# Patient Record
Sex: MALE | Race: WHITE
[De-identification: names, ages, dates, MRNs, and addresses within clinical notes are randomized per-mention and may not be internally consistent; named-entity substitution may affect disease eponyms.]

---

## 2017-06-14 ENCOUNTER — HOSPITAL ENCOUNTER (OUTPATIENT)
Dept: HOSPITAL 8 - RAD | Age: 57
Discharge: HOME | End: 2017-06-14
Attending: NEUROLOGICAL SURGERY
Payer: COMMERCIAL

## 2017-06-14 DIAGNOSIS — M47.896: ICD-10-CM

## 2017-06-14 DIAGNOSIS — G89.29: ICD-10-CM

## 2017-06-14 DIAGNOSIS — M51.26: Primary | ICD-10-CM

## 2017-06-14 DIAGNOSIS — M51.24: ICD-10-CM

## 2017-06-14 DIAGNOSIS — Z98.1: ICD-10-CM

## 2017-06-14 DIAGNOSIS — M48.06: ICD-10-CM

## 2017-06-14 DIAGNOSIS — N28.1: ICD-10-CM

## 2017-06-14 PROCEDURE — 72131 CT LUMBAR SPINE W/O DYE: CPT

## 2017-06-14 PROCEDURE — 72146 MRI CHEST SPINE W/O DYE: CPT

## 2017-06-26 ENCOUNTER — HOSPITAL ENCOUNTER (OUTPATIENT)
Dept: HOSPITAL 8 - RAD | Age: 57
Discharge: HOME | End: 2017-06-26
Attending: FAMILY MEDICINE
Payer: COMMERCIAL

## 2017-06-26 DIAGNOSIS — H53.8: ICD-10-CM

## 2017-06-26 DIAGNOSIS — G31.84: Primary | ICD-10-CM

## 2017-06-26 DIAGNOSIS — G44.52: ICD-10-CM

## 2017-06-26 PROCEDURE — A9585 GADOBUTROL INJECTION: HCPCS

## 2017-06-26 PROCEDURE — 70553 MRI BRAIN STEM W/O & W/DYE: CPT

## 2017-12-19 ENCOUNTER — HOSPITAL ENCOUNTER (OUTPATIENT)
Dept: HOSPITAL 8 - CARD | Age: 57
Discharge: HOME | End: 2017-12-19
Attending: PSYCHIATRY & NEUROLOGY
Payer: COMMERCIAL

## 2017-12-19 DIAGNOSIS — G44.021: ICD-10-CM

## 2017-12-19 DIAGNOSIS — R41.89: ICD-10-CM

## 2017-12-19 DIAGNOSIS — G43.111: Primary | ICD-10-CM

## 2017-12-19 PROCEDURE — 95819 EEG AWAKE AND ASLEEP: CPT

## 2017-12-27 ENCOUNTER — HOSPITAL ENCOUNTER (OUTPATIENT)
Dept: HOSPITAL 8 - RAD | Age: 57
Discharge: HOME | End: 2017-12-27
Attending: OTOLARYNGOLOGY
Payer: COMMERCIAL

## 2017-12-27 DIAGNOSIS — J38.00: ICD-10-CM

## 2017-12-27 DIAGNOSIS — R13.10: Primary | ICD-10-CM

## 2017-12-27 PROCEDURE — A9585 GADOBUTROL INJECTION: HCPCS

## 2017-12-27 PROCEDURE — 70543 MRI ORBT/FAC/NCK W/O &W/DYE: CPT

## 2017-12-27 PROCEDURE — 74220 X-RAY XM ESOPHAGUS 1CNTRST: CPT

## 2018-03-08 ENCOUNTER — HOSPITAL ENCOUNTER (OUTPATIENT)
Dept: HOSPITAL 8 - RAD | Age: 58
Discharge: HOME | End: 2018-03-08
Attending: INTERNAL MEDICINE
Payer: COMMERCIAL

## 2018-03-08 DIAGNOSIS — T81.4XXS: ICD-10-CM

## 2018-03-08 DIAGNOSIS — Z79.2: Primary | ICD-10-CM

## 2018-03-08 DIAGNOSIS — B95.61: ICD-10-CM

## 2018-03-08 PROCEDURE — C1751 CATH, INF, PER/CENT/MIDLINE: HCPCS

## 2018-03-08 PROCEDURE — 36569 INSJ PICC 5 YR+ W/O IMAGING: CPT

## 2018-03-08 PROCEDURE — 76937 US GUIDE VASCULAR ACCESS: CPT

## 2018-03-08 PROCEDURE — 77001 FLUOROGUIDE FOR VEIN DEVICE: CPT

## 2018-03-14 ENCOUNTER — HOSPITAL ENCOUNTER (OUTPATIENT)
Dept: HOSPITAL 8 - CLISVCS | Age: 58
Discharge: HOME | End: 2018-03-14
Attending: INTERNAL MEDICINE
Payer: COMMERCIAL

## 2018-03-14 DIAGNOSIS — R07.89: Primary | ICD-10-CM

## 2018-03-14 DIAGNOSIS — R94.31: ICD-10-CM

## 2018-03-14 PROCEDURE — 93005 ELECTROCARDIOGRAM TRACING: CPT

## 2018-07-06 ENCOUNTER — HOSPITAL ENCOUNTER (OUTPATIENT)
Dept: HOSPITAL 8 - RAD | Age: 58
Discharge: HOME | End: 2018-07-06
Attending: FAMILY MEDICINE
Payer: COMMERCIAL

## 2018-07-06 DIAGNOSIS — R51: ICD-10-CM

## 2018-07-06 DIAGNOSIS — R41.3: ICD-10-CM

## 2018-07-06 DIAGNOSIS — R42: ICD-10-CM

## 2018-07-06 DIAGNOSIS — R53.1: ICD-10-CM

## 2018-07-06 DIAGNOSIS — R41.840: Primary | ICD-10-CM

## 2018-07-06 PROCEDURE — A9585 GADOBUTROL INJECTION: HCPCS

## 2018-07-06 PROCEDURE — 70553 MRI BRAIN STEM W/O & W/DYE: CPT

## 2018-08-22 ENCOUNTER — HOSPITAL ENCOUNTER (OUTPATIENT)
Dept: HOSPITAL 8 - CFH | Age: 58
Discharge: HOME | End: 2018-08-22
Attending: NURSE PRACTITIONER
Payer: COMMERCIAL

## 2018-08-22 DIAGNOSIS — M47.892: ICD-10-CM

## 2018-08-22 DIAGNOSIS — Z88.5: ICD-10-CM

## 2018-08-22 DIAGNOSIS — M48.02: Primary | ICD-10-CM

## 2018-08-22 DIAGNOSIS — Z87.891: ICD-10-CM

## 2018-08-22 PROCEDURE — 72141 MRI NECK SPINE W/O DYE: CPT

## 2018-08-29 ENCOUNTER — HOSPITAL ENCOUNTER (OUTPATIENT)
Dept: HOSPITAL 8 - OR | Age: 58
Discharge: HOME | End: 2018-08-29
Attending: OTOLARYNGOLOGY
Payer: COMMERCIAL

## 2018-08-29 VITALS — SYSTOLIC BLOOD PRESSURE: 123 MMHG | DIASTOLIC BLOOD PRESSURE: 81 MMHG

## 2018-08-29 VITALS — BODY MASS INDEX: 25.17 KG/M2 | HEIGHT: 72 IN | WEIGHT: 185.85 LBS

## 2018-08-29 DIAGNOSIS — Z88.6: ICD-10-CM

## 2018-08-29 DIAGNOSIS — Z87.891: ICD-10-CM

## 2018-08-29 DIAGNOSIS — J38.01: Primary | ICD-10-CM

## 2018-08-29 DIAGNOSIS — Z98.890: ICD-10-CM

## 2018-08-29 DIAGNOSIS — G43.909: ICD-10-CM

## 2018-08-29 DIAGNOSIS — Z88.8: ICD-10-CM

## 2018-08-29 DIAGNOSIS — I69.354: ICD-10-CM

## 2018-08-29 DIAGNOSIS — Z86.73: ICD-10-CM

## 2018-08-29 DIAGNOSIS — Z88.5: ICD-10-CM

## 2018-08-29 PROCEDURE — C1878 MATRL FOR VOCAL CORD: HCPCS

## 2018-08-29 PROCEDURE — 31571 LARYNGOSCOP W/VC INJ + SCOPE: CPT

## 2018-11-19 ENCOUNTER — HOSPITAL ENCOUNTER (OUTPATIENT)
Dept: HOSPITAL 8 - RAD | Age: 58
Discharge: HOME | End: 2018-11-19
Attending: NURSE PRACTITIONER
Payer: COMMERCIAL

## 2018-11-19 DIAGNOSIS — R20.2: ICD-10-CM

## 2018-11-19 DIAGNOSIS — G43.109: Primary | ICD-10-CM

## 2018-11-19 PROCEDURE — A9585 GADOBUTROL INJECTION: HCPCS

## 2018-11-19 PROCEDURE — 70553 MRI BRAIN STEM W/O & W/DYE: CPT

## 2018-12-26 ENCOUNTER — HOSPITAL ENCOUNTER (OUTPATIENT)
Dept: HOSPITAL 8 - RAD | Age: 58
Discharge: HOME | End: 2018-12-26
Attending: PHYSICAL MEDICINE & REHABILITATION
Payer: COMMERCIAL

## 2018-12-26 DIAGNOSIS — M48.02: Primary | ICD-10-CM

## 2018-12-26 PROCEDURE — 72141 MRI NECK SPINE W/O DYE: CPT

## 2019-02-26 ENCOUNTER — HOSPITAL ENCOUNTER (OUTPATIENT)
Dept: HOSPITAL 8 - CFH | Age: 59
Discharge: HOME | End: 2019-02-26
Attending: NEUROLOGICAL SURGERY
Payer: COMMERCIAL

## 2019-02-26 DIAGNOSIS — M50.322: Primary | ICD-10-CM

## 2019-02-26 PROCEDURE — 72040 X-RAY EXAM NECK SPINE 2-3 VW: CPT

## 2019-06-14 ENCOUNTER — HOSPITAL ENCOUNTER (OUTPATIENT)
Dept: HOSPITAL 8 - CACL | Age: 59
Discharge: HOME | End: 2019-06-14
Attending: INTERNAL MEDICINE
Payer: COMMERCIAL

## 2019-06-14 VITALS — HEIGHT: 72 IN | WEIGHT: 212.44 LBS | BODY MASS INDEX: 28.77 KG/M2

## 2019-06-14 VITALS — SYSTOLIC BLOOD PRESSURE: 139 MMHG | DIASTOLIC BLOOD PRESSURE: 87 MMHG

## 2019-06-14 DIAGNOSIS — Z98.890: ICD-10-CM

## 2019-06-14 DIAGNOSIS — I34.1: ICD-10-CM

## 2019-06-14 DIAGNOSIS — Z79.899: ICD-10-CM

## 2019-06-14 DIAGNOSIS — E78.2: ICD-10-CM

## 2019-06-14 DIAGNOSIS — Z88.8: ICD-10-CM

## 2019-06-14 DIAGNOSIS — I10: ICD-10-CM

## 2019-06-14 DIAGNOSIS — I34.0: Primary | ICD-10-CM

## 2019-06-14 DIAGNOSIS — Z90.49: ICD-10-CM

## 2019-06-14 PROCEDURE — 93312 ECHO TRANSESOPHAGEAL: CPT

## 2019-06-14 PROCEDURE — 93325 DOPPLER ECHO COLOR FLOW MAPG: CPT

## 2019-06-26 ENCOUNTER — HOSPITAL ENCOUNTER (OUTPATIENT)
Dept: HOSPITAL 8 - CACL | Age: 59
Discharge: HOME | End: 2019-06-26
Attending: INTERNAL MEDICINE
Payer: COMMERCIAL

## 2019-06-26 VITALS — BODY MASS INDEX: 29.05 KG/M2 | HEIGHT: 72 IN | WEIGHT: 214.44 LBS

## 2019-06-26 VITALS — SYSTOLIC BLOOD PRESSURE: 136 MMHG | DIASTOLIC BLOOD PRESSURE: 79 MMHG

## 2019-06-26 DIAGNOSIS — E78.2: ICD-10-CM

## 2019-06-26 DIAGNOSIS — I25.83: ICD-10-CM

## 2019-06-26 DIAGNOSIS — I10: ICD-10-CM

## 2019-06-26 DIAGNOSIS — I25.10: Primary | ICD-10-CM

## 2019-06-26 DIAGNOSIS — Z88.8: ICD-10-CM

## 2019-06-26 DIAGNOSIS — Z87.891: ICD-10-CM

## 2019-06-26 DIAGNOSIS — Z79.899: ICD-10-CM

## 2019-06-26 DIAGNOSIS — Z91.048: ICD-10-CM

## 2019-06-26 DIAGNOSIS — I34.0: ICD-10-CM

## 2019-06-26 PROCEDURE — C1769 GUIDE WIRE: HCPCS

## 2019-06-26 PROCEDURE — 93458 L HRT ARTERY/VENTRICLE ANGIO: CPT

## 2019-06-26 PROCEDURE — 85025 COMPLETE CBC W/AUTO DIFF WBC: CPT

## 2019-06-26 PROCEDURE — C1894 INTRO/SHEATH, NON-LASER: HCPCS

## 2019-06-26 PROCEDURE — 36415 COLL VENOUS BLD VENIPUNCTURE: CPT

## 2019-06-26 PROCEDURE — 80048 BASIC METABOLIC PNL TOTAL CA: CPT

## 2019-06-26 PROCEDURE — 99156 MOD SED OTH PHYS/QHP 5/>YRS: CPT

## 2019-07-17 ENCOUNTER — HOSPITAL ENCOUNTER (EMERGENCY)
Dept: HOSPITAL 8 - ED | Age: 59
Discharge: HOME | End: 2019-07-17
Payer: COMMERCIAL

## 2019-07-17 VITALS — HEIGHT: 72 IN | BODY MASS INDEX: 28.37 KG/M2 | WEIGHT: 209.44 LBS

## 2019-07-17 VITALS — DIASTOLIC BLOOD PRESSURE: 77 MMHG | SYSTOLIC BLOOD PRESSURE: 134 MMHG

## 2019-07-17 DIAGNOSIS — Z90.49: ICD-10-CM

## 2019-07-17 DIAGNOSIS — Z86.73: ICD-10-CM

## 2019-07-17 DIAGNOSIS — E78.5: ICD-10-CM

## 2019-07-17 DIAGNOSIS — E78.00: ICD-10-CM

## 2019-07-17 DIAGNOSIS — Z87.891: ICD-10-CM

## 2019-07-17 DIAGNOSIS — R07.89: Primary | ICD-10-CM

## 2019-07-17 DIAGNOSIS — I10: ICD-10-CM

## 2019-07-17 DIAGNOSIS — R06.00: ICD-10-CM

## 2019-07-17 LAB
ALBUMIN SERPL-MCNC: 4.1 G/DL (ref 3.4–5)
ANION GAP SERPL CALC-SCNC: 7 MMOL/L (ref 5–15)
BASOPHILS # BLD AUTO: 0.03 X10^3/UL (ref 0–0.1)
BASOPHILS NFR BLD AUTO: 0 % (ref 0–1)
CALCIUM SERPL-MCNC: 9.6 MG/DL (ref 8.5–10.1)
CHLORIDE SERPL-SCNC: 106 MMOL/L (ref 98–107)
CREAT SERPL-MCNC: 1.47 MG/DL (ref 0.7–1.3)
EOSINOPHIL # BLD AUTO: 0.04 X10^3/UL (ref 0–0.4)
EOSINOPHIL NFR BLD AUTO: 1 % (ref 1–7)
ERYTHROCYTE [DISTWIDTH] IN BLOOD BY AUTOMATED COUNT: 14 % (ref 9.4–14.8)
LYMPHOCYTES # BLD AUTO: 1.99 X10^3/UL (ref 1–3.4)
LYMPHOCYTES NFR BLD AUTO: 29 % (ref 22–44)
MCH RBC QN AUTO: 30.5 PG (ref 27.5–34.5)
MCHC RBC AUTO-ENTMCNC: 33 G/DL (ref 33.2–36.2)
MCV RBC AUTO: 92.3 FL (ref 81–97)
MD: NO
MONOCYTES # BLD AUTO: 0.51 X10^3/UL (ref 0.2–0.8)
MONOCYTES NFR BLD AUTO: 8 % (ref 2–9)
NEUTROPHILS # BLD AUTO: 4.21 X10^3/UL (ref 1.8–6.8)
NEUTROPHILS NFR BLD AUTO: 62 % (ref 42–75)
PLATELET # BLD AUTO: 248 X10^3/UL (ref 130–400)
PMV BLD AUTO: 8.3 FL (ref 7.4–10.4)
RBC # BLD AUTO: 5.41 X10^6/UL (ref 4.38–5.82)

## 2019-07-17 PROCEDURE — 99284 EMERGENCY DEPT VISIT MOD MDM: CPT

## 2019-07-17 PROCEDURE — 80048 BASIC METABOLIC PNL TOTAL CA: CPT

## 2019-07-17 PROCEDURE — 36415 COLL VENOUS BLD VENIPUNCTURE: CPT

## 2019-07-17 PROCEDURE — 71045 X-RAY EXAM CHEST 1 VIEW: CPT

## 2019-07-17 PROCEDURE — 82040 ASSAY OF SERUM ALBUMIN: CPT

## 2019-07-17 PROCEDURE — 93005 ELECTROCARDIOGRAM TRACING: CPT

## 2019-07-17 PROCEDURE — 85025 COMPLETE CBC W/AUTO DIFF WBC: CPT

## 2019-07-17 PROCEDURE — 83880 ASSAY OF NATRIURETIC PEPTIDE: CPT

## 2019-07-17 NOTE — NUR
Discharge education given by RN; pt verbalized understanding; fu with 
cardiothoracic surgeon reinforced

## 2019-07-17 NOTE — NUR
Pt arrived to room with friend at 1315; placed on monitor; VSS; pt AO; shows no 
signs of distress; skin pink warm and dry.

1330 MD to bedside; spouse at bedside; known cardiac valve issues; recent cath 
negative for coronary arterial disease. Family verbalizes understanding of POC.

## 2019-08-08 ENCOUNTER — HOSPITAL ENCOUNTER (OUTPATIENT)
Dept: HOSPITAL 8 - CARD | Age: 59
Discharge: HOME | End: 2019-08-08
Attending: THORACIC SURGERY (CARDIOTHORACIC VASCULAR SURGERY)
Payer: COMMERCIAL

## 2019-08-08 DIAGNOSIS — I50.9: ICD-10-CM

## 2019-08-08 DIAGNOSIS — I34.0: ICD-10-CM

## 2019-08-08 DIAGNOSIS — Z01.810: Primary | ICD-10-CM

## 2019-08-08 DIAGNOSIS — G43.909: ICD-10-CM

## 2019-08-08 DIAGNOSIS — I11.0: ICD-10-CM

## 2019-08-08 DIAGNOSIS — E78.5: ICD-10-CM

## 2019-08-08 DIAGNOSIS — Z87.891: ICD-10-CM

## 2019-08-08 DIAGNOSIS — Z88.5: ICD-10-CM

## 2019-08-08 DIAGNOSIS — Z90.49: ICD-10-CM

## 2019-08-08 PROCEDURE — 93880 EXTRACRANIAL BILAT STUDY: CPT

## 2019-08-09 ENCOUNTER — HOSPITAL ENCOUNTER (INPATIENT)
Dept: HOSPITAL 8 - 5SO | Age: 59
LOS: 5 days | Discharge: HOME | DRG: 219 | End: 2019-08-14
Attending: THORACIC SURGERY (CARDIOTHORACIC VASCULAR SURGERY) | Admitting: THORACIC SURGERY (CARDIOTHORACIC VASCULAR SURGERY)
Payer: COMMERCIAL

## 2019-08-09 VITALS — WEIGHT: 213.63 LBS | HEIGHT: 72 IN | BODY MASS INDEX: 28.93 KG/M2

## 2019-08-09 DIAGNOSIS — Z87.891: ICD-10-CM

## 2019-08-09 DIAGNOSIS — Z88.5: ICD-10-CM

## 2019-08-09 DIAGNOSIS — Z88.8: ICD-10-CM

## 2019-08-09 DIAGNOSIS — I50.33: ICD-10-CM

## 2019-08-09 DIAGNOSIS — Z90.49: ICD-10-CM

## 2019-08-09 DIAGNOSIS — Z79.01: ICD-10-CM

## 2019-08-09 DIAGNOSIS — I11.0: ICD-10-CM

## 2019-08-09 DIAGNOSIS — I34.1: ICD-10-CM

## 2019-08-09 DIAGNOSIS — K59.00: ICD-10-CM

## 2019-08-09 DIAGNOSIS — E78.5: ICD-10-CM

## 2019-08-09 DIAGNOSIS — I34.0: Primary | ICD-10-CM

## 2019-08-09 PROCEDURE — 93005 ELECTROCARDIOGRAM TRACING: CPT

## 2019-08-09 PROCEDURE — 85610 PROTHROMBIN TIME: CPT

## 2019-08-09 PROCEDURE — 94150 VITAL CAPACITY TEST: CPT

## 2019-08-09 PROCEDURE — 80048 BASIC METABOLIC PNL TOTAL CA: CPT

## 2019-08-09 PROCEDURE — 82800 BLOOD PH: CPT

## 2019-08-09 PROCEDURE — 36415 COLL VENOUS BLD VENIPUNCTURE: CPT

## 2019-08-09 PROCEDURE — 93321 DOPPLER ECHO F-UP/LMTD STD: CPT

## 2019-08-09 PROCEDURE — 82810 BLOOD GASES O2 SAT ONLY: CPT

## 2019-08-09 PROCEDURE — 94002 VENT MGMT INPAT INIT DAY: CPT

## 2019-08-09 PROCEDURE — 82947 ASSAY GLUCOSE BLOOD QUANT: CPT

## 2019-08-09 PROCEDURE — 80053 COMPREHEN METABOLIC PANEL: CPT

## 2019-08-09 PROCEDURE — 74018 RADEX ABDOMEN 1 VIEW: CPT

## 2019-08-09 PROCEDURE — 85049 AUTOMATED PLATELET COUNT: CPT

## 2019-08-09 PROCEDURE — C1760 CLOSURE DEV, VASC: HCPCS

## 2019-08-09 PROCEDURE — 71046 X-RAY EXAM CHEST 2 VIEWS: CPT

## 2019-08-09 PROCEDURE — 94640 AIRWAY INHALATION TREATMENT: CPT

## 2019-08-09 PROCEDURE — 83735 ASSAY OF MAGNESIUM: CPT

## 2019-08-09 PROCEDURE — 81003 URINALYSIS AUTO W/O SCOPE: CPT

## 2019-08-09 PROCEDURE — 83036 HEMOGLOBIN GLYCOSYLATED A1C: CPT

## 2019-08-09 PROCEDURE — 85014 HEMATOCRIT: CPT

## 2019-08-09 PROCEDURE — B246ZZ4 ULTRASONOGRAPHY OF RIGHT AND LEFT HEART, TRANSESOPHAGEAL: ICD-10-PCS | Performed by: THORACIC SURGERY (CARDIOTHORACIC VASCULAR SURGERY)

## 2019-08-09 PROCEDURE — 85730 THROMBOPLASTIN TIME PARTIAL: CPT

## 2019-08-09 PROCEDURE — P9045 ALBUMIN (HUMAN), 5%, 250 ML: HCPCS

## 2019-08-09 PROCEDURE — 36600 WITHDRAWAL OF ARTERIAL BLOOD: CPT

## 2019-08-09 PROCEDURE — 82962 GLUCOSE BLOOD TEST: CPT

## 2019-08-09 PROCEDURE — 85018 HEMOGLOBIN: CPT

## 2019-08-09 PROCEDURE — 88305 TISSUE EXAM BY PATHOLOGIST: CPT

## 2019-08-09 PROCEDURE — 5A1221Z PERFORMANCE OF CARDIAC OUTPUT, CONTINUOUS: ICD-10-PCS | Performed by: THORACIC SURGERY (CARDIOTHORACIC VASCULAR SURGERY)

## 2019-08-09 PROCEDURE — 71045 X-RAY EXAM CHEST 1 VIEW: CPT

## 2019-08-09 PROCEDURE — 82330 ASSAY OF CALCIUM: CPT

## 2019-08-09 PROCEDURE — 87081 CULTURE SCREEN ONLY: CPT

## 2019-08-09 PROCEDURE — 93325 DOPPLER ECHO COLOR FLOW MAPG: CPT

## 2019-08-09 PROCEDURE — 85025 COMPLETE CBC W/AUTO DIFF WBC: CPT

## 2019-08-09 PROCEDURE — 02UG08Z SUPPLEMENT MITRAL VALVE WITH ZOOPLASTIC TISSUE, OPEN APPROACH: ICD-10-PCS | Performed by: THORACIC SURGERY (CARDIOTHORACIC VASCULAR SURGERY)

## 2019-08-09 PROCEDURE — 86900 BLOOD TYPING SEROLOGIC ABO: CPT

## 2019-08-09 PROCEDURE — 82803 BLOOD GASES ANY COMBINATION: CPT

## 2019-08-09 PROCEDURE — 84295 ASSAY OF SERUM SODIUM: CPT

## 2019-08-09 PROCEDURE — C1751 CATH, INF, PER/CENT/MIDLINE: HCPCS

## 2019-08-09 PROCEDURE — 85347 COAGULATION TIME ACTIVATED: CPT

## 2019-08-09 PROCEDURE — P9047 ALBUMIN (HUMAN), 25%, 50ML: HCPCS

## 2019-08-09 PROCEDURE — 86923 COMPATIBILITY TEST ELECTRIC: CPT

## 2019-08-09 PROCEDURE — 93312 ECHO TRANSESOPHAGEAL: CPT

## 2019-08-09 PROCEDURE — 84132 ASSAY OF SERUM POTASSIUM: CPT

## 2019-08-09 PROCEDURE — 86850 RBC ANTIBODY SCREEN: CPT

## 2019-08-09 PROCEDURE — 82040 ASSAY OF SERUM ALBUMIN: CPT

## 2019-08-14 VITALS — SYSTOLIC BLOOD PRESSURE: 132 MMHG | DIASTOLIC BLOOD PRESSURE: 84 MMHG

## 2019-08-15 ENCOUNTER — HOSPITAL ENCOUNTER (INPATIENT)
Dept: HOSPITAL 8 - ED | Age: 59
LOS: 1 days | Discharge: HOME | DRG: 73 | End: 2019-08-16
Attending: INTERNAL MEDICINE | Admitting: INTERNAL MEDICINE
Payer: COMMERCIAL

## 2019-08-15 VITALS — HEIGHT: 72 IN | BODY MASS INDEX: 27.47 KG/M2 | WEIGHT: 202.83 LBS

## 2019-08-15 DIAGNOSIS — Z87.891: ICD-10-CM

## 2019-08-15 DIAGNOSIS — Z95.2: ICD-10-CM

## 2019-08-15 DIAGNOSIS — Z86.73: ICD-10-CM

## 2019-08-15 DIAGNOSIS — T45.515A: ICD-10-CM

## 2019-08-15 DIAGNOSIS — E78.00: ICD-10-CM

## 2019-08-15 DIAGNOSIS — Z88.6: ICD-10-CM

## 2019-08-15 DIAGNOSIS — G93.41: ICD-10-CM

## 2019-08-15 DIAGNOSIS — Z91.048: ICD-10-CM

## 2019-08-15 DIAGNOSIS — I11.9: ICD-10-CM

## 2019-08-15 DIAGNOSIS — Z88.5: ICD-10-CM

## 2019-08-15 DIAGNOSIS — G90.9: Primary | ICD-10-CM

## 2019-08-15 DIAGNOSIS — Z80.3: ICD-10-CM

## 2019-08-15 DIAGNOSIS — Z83.3: ICD-10-CM

## 2019-08-15 DIAGNOSIS — I48.92: ICD-10-CM

## 2019-08-15 DIAGNOSIS — E78.5: ICD-10-CM

## 2019-08-15 DIAGNOSIS — Z80.8: ICD-10-CM

## 2019-08-15 DIAGNOSIS — G43.109: ICD-10-CM

## 2019-08-15 DIAGNOSIS — Z82.49: ICD-10-CM

## 2019-08-15 DIAGNOSIS — K21.9: ICD-10-CM

## 2019-08-15 DIAGNOSIS — R79.1: ICD-10-CM

## 2019-08-15 DIAGNOSIS — Z79.01: ICD-10-CM

## 2019-08-15 PROCEDURE — 71045 X-RAY EXAM CHEST 1 VIEW: CPT

## 2019-08-15 PROCEDURE — 87040 BLOOD CULTURE FOR BACTERIA: CPT

## 2019-08-15 PROCEDURE — 81003 URINALYSIS AUTO W/O SCOPE: CPT

## 2019-08-15 PROCEDURE — 85610 PROTHROMBIN TIME: CPT

## 2019-08-15 PROCEDURE — 84100 ASSAY OF PHOSPHORUS: CPT

## 2019-08-15 PROCEDURE — 93306 TTE W/DOPPLER COMPLETE: CPT

## 2019-08-15 PROCEDURE — 0399T: CPT

## 2019-08-15 PROCEDURE — 80053 COMPREHEN METABOLIC PANEL: CPT

## 2019-08-15 PROCEDURE — 83735 ASSAY OF MAGNESIUM: CPT

## 2019-08-15 PROCEDURE — 93005 ELECTROCARDIOGRAM TRACING: CPT

## 2019-08-15 PROCEDURE — 99285 EMERGENCY DEPT VISIT HI MDM: CPT

## 2019-08-15 PROCEDURE — 36415 COLL VENOUS BLD VENIPUNCTURE: CPT

## 2019-08-15 PROCEDURE — 85025 COMPLETE CBC W/AUTO DIFF WBC: CPT

## 2019-08-15 PROCEDURE — 70450 CT HEAD/BRAIN W/O DYE: CPT

## 2019-08-16 VITALS — DIASTOLIC BLOOD PRESSURE: 63 MMHG | SYSTOLIC BLOOD PRESSURE: 96 MMHG

## 2019-11-20 ENCOUNTER — HOSPITAL ENCOUNTER (EMERGENCY)
Dept: HOSPITAL 8 - ED | Age: 59
Discharge: HOME | End: 2019-11-20
Payer: COMMERCIAL

## 2019-11-20 VITALS — DIASTOLIC BLOOD PRESSURE: 82 MMHG | SYSTOLIC BLOOD PRESSURE: 125 MMHG

## 2019-11-20 VITALS — HEIGHT: 72 IN | WEIGHT: 205.03 LBS | BODY MASS INDEX: 27.77 KG/M2

## 2019-11-20 DIAGNOSIS — I25.2: ICD-10-CM

## 2019-11-20 DIAGNOSIS — Z86.73: ICD-10-CM

## 2019-11-20 DIAGNOSIS — R07.1: ICD-10-CM

## 2019-11-20 DIAGNOSIS — Z90.49: ICD-10-CM

## 2019-11-20 DIAGNOSIS — R07.2: Primary | ICD-10-CM

## 2019-11-20 DIAGNOSIS — E78.5: ICD-10-CM

## 2019-11-20 DIAGNOSIS — K21.9: ICD-10-CM

## 2019-11-20 DIAGNOSIS — I10: ICD-10-CM

## 2019-11-20 DIAGNOSIS — Z87.891: ICD-10-CM

## 2019-11-20 LAB
ALBUMIN SERPL-MCNC: 4.2 G/DL (ref 3.4–5)
ANION GAP SERPL CALC-SCNC: 6 MMOL/L (ref 5–15)
BASOPHILS # BLD AUTO: 0.04 X10^3/UL (ref 0–0.1)
BASOPHILS NFR BLD AUTO: 1 % (ref 0–1)
CALCIUM SERPL-MCNC: 9 MG/DL (ref 8.5–10.1)
CHLORIDE SERPL-SCNC: 105 MMOL/L (ref 98–107)
CREAT SERPL-MCNC: 1.55 MG/DL (ref 0.7–1.3)
EOSINOPHIL # BLD AUTO: 0.05 X10^3/UL (ref 0–0.4)
EOSINOPHIL NFR BLD AUTO: 1 % (ref 1–7)
ERYTHROCYTE [DISTWIDTH] IN BLOOD BY AUTOMATED COUNT: 14.8 % (ref 9.4–14.8)
LYMPHOCYTES # BLD AUTO: 1.68 X10^3/UL (ref 1–3.4)
LYMPHOCYTES NFR BLD AUTO: 34 % (ref 22–44)
MCH RBC QN AUTO: 26.7 PG (ref 27.5–34.5)
MCHC RBC AUTO-ENTMCNC: 31.4 G/DL (ref 33.2–36.2)
MCV RBC AUTO: 85.1 FL (ref 81–97)
MD: NO
MONOCYTES # BLD AUTO: 0.43 X10^3/UL (ref 0.2–0.8)
MONOCYTES NFR BLD AUTO: 9 % (ref 2–9)
NEUTROPHILS # BLD AUTO: 2.81 X10^3/UL (ref 1.8–6.8)
NEUTROPHILS NFR BLD AUTO: 56 % (ref 42–75)
PLATELET # BLD AUTO: 295 X10^3/UL (ref 130–400)
PMV BLD AUTO: 8.2 FL (ref 7.4–10.4)
RBC # BLD AUTO: 5.38 X10^6/UL (ref 4.38–5.82)
TROPONIN I SERPL-MCNC: < 0.015 NG/ML (ref 0–0.04)
TROPONIN I SERPL-MCNC: < 0.015 NG/ML (ref 0–0.04)

## 2019-11-20 PROCEDURE — 84484 ASSAY OF TROPONIN QUANT: CPT

## 2019-11-20 PROCEDURE — 85025 COMPLETE CBC W/AUTO DIFF WBC: CPT

## 2019-11-20 PROCEDURE — 36415 COLL VENOUS BLD VENIPUNCTURE: CPT

## 2019-11-20 PROCEDURE — 99284 EMERGENCY DEPT VISIT MOD MDM: CPT

## 2019-11-20 PROCEDURE — 80048 BASIC METABOLIC PNL TOTAL CA: CPT

## 2019-11-20 PROCEDURE — 71045 X-RAY EXAM CHEST 1 VIEW: CPT

## 2019-11-20 PROCEDURE — 93005 ELECTROCARDIOGRAM TRACING: CPT

## 2019-11-20 PROCEDURE — 82040 ASSAY OF SERUM ALBUMIN: CPT

## 2019-11-20 NOTE — NUR
TRIAGE RN NOTE: EKG TAKEN IN TRIAGE, REVIEWED BY NAIDA DEL VALLE. THIS RN DISCUSSED 
PT'S NEURO SX WITH MD, PT STATES DIZZINESS AND DISORIENTATION HE IS 
EXPERIENCING IS TYPICAL FOR MIGRAINE SX, PT HAS HX MIGRAINE AND TIA ASSOC WITH 
MIGRAINE. MD TO EVALUATE PT. PT A&OX4, NEURO INTACT. AMBULATORY TO ROOM 13 WITH 
STEADY GAIT, ALL MONITORS APPLIED.

## 2020-01-23 ENCOUNTER — HOSPITAL ENCOUNTER (EMERGENCY)
Dept: HOSPITAL 8 - ED | Age: 60
Discharge: HOME | End: 2020-01-23
Payer: COMMERCIAL

## 2020-01-23 VITALS — DIASTOLIC BLOOD PRESSURE: 85 MMHG | SYSTOLIC BLOOD PRESSURE: 146 MMHG

## 2020-01-23 VITALS — BODY MASS INDEX: 27.77 KG/M2 | HEIGHT: 72 IN | WEIGHT: 205.03 LBS

## 2020-01-23 DIAGNOSIS — R07.89: Primary | ICD-10-CM

## 2020-01-23 DIAGNOSIS — I25.2: ICD-10-CM

## 2020-01-23 DIAGNOSIS — I10: ICD-10-CM

## 2020-01-23 DIAGNOSIS — Z86.73: ICD-10-CM

## 2020-01-23 DIAGNOSIS — E78.5: ICD-10-CM

## 2020-01-23 DIAGNOSIS — Z90.49: ICD-10-CM

## 2020-01-23 LAB
ANION GAP SERPL CALC-SCNC: 6 MMOL/L (ref 5–15)
BASOPHILS # BLD AUTO: 0.05 X10^3/UL (ref 0–0.1)
BASOPHILS NFR BLD AUTO: 1 % (ref 0–1)
CALCIUM SERPL-MCNC: 8.9 MG/DL (ref 8.5–10.1)
CHLORIDE SERPL-SCNC: 107 MMOL/L (ref 98–107)
CREAT SERPL-MCNC: 1.22 MG/DL (ref 0.7–1.3)
CULTURE INDICATED?: NO
EOSINOPHIL # BLD AUTO: 0.07 X10^3/UL (ref 0–0.4)
EOSINOPHIL NFR BLD AUTO: 1 % (ref 1–7)
ERYTHROCYTE [DISTWIDTH] IN BLOOD BY AUTOMATED COUNT: 16.7 % (ref 9.4–14.8)
LYMPHOCYTES # BLD AUTO: 2.8 X10^3/UL (ref 1–3.4)
LYMPHOCYTES NFR BLD AUTO: 40 % (ref 22–44)
MCH RBC QN AUTO: 26.4 PG (ref 27.5–34.5)
MCHC RBC AUTO-ENTMCNC: 31.6 G/DL (ref 33.2–36.2)
MCV RBC AUTO: 83.4 FL (ref 81–97)
MD: NO
MICROSCOPIC: (no result)
MONOCYTES # BLD AUTO: 0.6 X10^3/UL (ref 0.2–0.8)
MONOCYTES NFR BLD AUTO: 9 % (ref 2–9)
NEUTROPHILS # BLD AUTO: 3.47 X10^3/UL (ref 1.8–6.8)
NEUTROPHILS NFR BLD AUTO: 50 % (ref 42–75)
PLATELET # BLD AUTO: 277 X10^3/UL (ref 130–400)
PMV BLD AUTO: 8.7 FL (ref 7.4–10.4)
RBC # BLD AUTO: 5.7 X10^6/UL (ref 4.38–5.82)
TROPONIN I SERPL-MCNC: < 0.015 NG/ML (ref 0–0.04)

## 2020-01-23 PROCEDURE — 71275 CT ANGIOGRAPHY CHEST: CPT

## 2020-01-23 PROCEDURE — 81003 URINALYSIS AUTO W/O SCOPE: CPT

## 2020-01-23 PROCEDURE — 36415 COLL VENOUS BLD VENIPUNCTURE: CPT

## 2020-01-23 PROCEDURE — 84484 ASSAY OF TROPONIN QUANT: CPT

## 2020-01-23 PROCEDURE — 93005 ELECTROCARDIOGRAM TRACING: CPT

## 2020-01-23 PROCEDURE — 96374 THER/PROPH/DIAG INJ IV PUSH: CPT

## 2020-01-23 PROCEDURE — 71046 X-RAY EXAM CHEST 2 VIEWS: CPT

## 2020-01-23 PROCEDURE — 80048 BASIC METABOLIC PNL TOTAL CA: CPT

## 2020-01-23 PROCEDURE — 99284 EMERGENCY DEPT VISIT MOD MDM: CPT

## 2020-01-23 PROCEDURE — 85025 COMPLETE CBC W/AUTO DIFF WBC: CPT

## 2020-01-23 NOTE — NUR
PT CAME IN CO OF PAIN IN HIS LEFT CHEST UNDERNEATH HIS ARMPIT INTHE THE 
PHLEBOSTATIC AXIS. PT DENIES NAUSEA, SOB, DIZZYNESS. EKG DONE. URINE AND BLOOD 
HAS BEEN SENT TO LAB. PT IS HOOKED UP TO CARDIAC MONITOR. WIFE IS BEDSIDE. WARM 
BLANKET PROVIDED.